# Patient Record
Sex: MALE | Race: WHITE | NOT HISPANIC OR LATINO | Employment: STUDENT | URBAN - METROPOLITAN AREA
[De-identification: names, ages, dates, MRNs, and addresses within clinical notes are randomized per-mention and may not be internally consistent; named-entity substitution may affect disease eponyms.]

---

## 2020-08-24 RX ORDER — MEMANTINE HYDROCHLORIDE 10 MG/1
TABLET ORAL
Qty: 180 TABLET | Refills: 1 | OUTPATIENT
Start: 2020-08-24

## 2020-12-06 RX ORDER — MEMANTINE HYDROCHLORIDE 10 MG/1
TABLET ORAL
Qty: 180 TABLET | Refills: 0 | Status: SHIPPED | OUTPATIENT
Start: 2020-12-06 | End: 2021-03-07

## 2021-02-12 ENCOUNTER — OFFICE VISIT (OUTPATIENT)
Dept: FAMILY MEDICINE CLINIC | Facility: CLINIC | Age: 21
End: 2021-02-12

## 2021-02-12 VITALS
BODY MASS INDEX: 18.83 KG/M2 | TEMPERATURE: 96.9 F | HEART RATE: 73 BPM | DIASTOLIC BLOOD PRESSURE: 60 MMHG | OXYGEN SATURATION: 100 % | WEIGHT: 120 LBS | HEIGHT: 67 IN | SYSTOLIC BLOOD PRESSURE: 100 MMHG

## 2021-02-12 DIAGNOSIS — R56.9 SEIZURE (HCC): ICD-10-CM

## 2021-02-12 DIAGNOSIS — Z79.899 HIGH RISK MEDICATION USE: ICD-10-CM

## 2021-02-12 DIAGNOSIS — F51.01 INSOMNIA, IDIOPATHIC: ICD-10-CM

## 2021-02-12 DIAGNOSIS — F84.0 AUTISM: Primary | ICD-10-CM

## 2021-02-12 DIAGNOSIS — E55.9 VITAMIN D DEFICIENCY: ICD-10-CM

## 2021-02-12 PROBLEM — G40.812 LENNOX-GASTAUT SYNDROME (HCC): Status: ACTIVE | Noted: 2019-04-26

## 2021-02-12 PROCEDURE — 99204 OFFICE O/P NEW MOD 45 MIN: CPT | Performed by: FAMILY MEDICINE

## 2021-02-12 RX ORDER — LANOLIN ALCOHOL/MO/W.PET/CERES
3 CREAM (GRAM) TOPICAL DAILY
COMMUNITY

## 2021-02-12 RX ORDER — CANNABIDIOL 100 MG/ML
SOLUTION ORAL
COMMUNITY
Start: 2021-02-09

## 2021-02-12 NOTE — PROGRESS NOTES
Subjective   Kush Buckley is a 20 y.o. male.     Vitals:    02/12/21 0908   BP: 100/60   Pulse: 73   Temp: 96.9 °F (36.1 °C)   SpO2: 100%        Chief Complaint   Patient presents with   • Autistic Spectrum     new to get established   • Seizures        History of Present Illness    Greater than 2-year follow-up on autism spectrum disorder, insomnia, vitamin D deficiency, and seizure disorder.  Here to get established today, due for labs?  LOV with me at [facility] February 2018, no changes made with [facility].  Does see neurologist (Dr. Valerio) every 6 months for seizure disorder.    Overall, doing very well all considering the uncertainty with changes in routine given the coronavirus pandemic.  Has been back in session on a regular basis at [facility], full-time and things are going well there.  However, will not wear mask.  No maladaptive behaviors or concerns from mom reported.  Tolerates and compliant with all medications.  All supportive services in place, no changes made.  Eating well, sleeping well.  Still takes melatonin nightly.    Yet, did have a breakthrough seizure last week.  This was the first seizure in over 8 to 9 months.  No changes in his seizure medication have been made in quite some time.  He does have a follow-up with his neurologist next week.  No labs have been done in over 3 years.    The following portions of the patient's history were reviewed and updated as appropriate: allergies, current medications, past family history, past medical history, past social history, past surgical history and problem list.    Review of Systems   Constitutional: Negative for unexpected weight gain and unexpected weight loss.   Respiratory: Negative for cough and shortness of breath.    Cardiovascular: Negative for chest pain.       Objective   Physical Exam  Vitals signs and nursing note reviewed.   Constitutional:       Appearance: Normal appearance. He is well-developed and normal weight.    HENT:      Head: Normocephalic and atraumatic.      Nose: Nose normal.   Eyes:      Conjunctiva/sclera: Conjunctivae normal.      Pupils: Pupils are equal, round, and reactive to light.   Neck:      Musculoskeletal: Normal range of motion and neck supple.      Thyroid: No thyromegaly.   Cardiovascular:      Rate and Rhythm: Normal rate and regular rhythm.      Heart sounds: Normal heart sounds. No murmur.   Pulmonary:      Effort: Pulmonary effort is normal.      Breath sounds: Normal breath sounds.   Abdominal:      General: Abdomen is flat. Bowel sounds are normal. There is no distension.      Palpations: Abdomen is soft. There is no hepatomegaly, splenomegaly or mass.      Tenderness: There is no abdominal tenderness. There is no guarding or rebound.      Hernia: No hernia is present.   Musculoskeletal: Normal range of motion.      Right lower leg: No edema.      Left lower leg: No edema.   Lymphadenopathy:      Cervical: No cervical adenopathy.   Skin:     General: Skin is warm.   Neurological:      General: No focal deficit present.      Mental Status: He is alert. Mental status is at baseline.   Psychiatric:         Mood and Affect: Mood normal. Affect is flat.         Speech: He is noncommunicative.         Behavior: Behavior normal. Behavior is cooperative.      Comments: Some stimming noted, very cooperative though          LABS/STUDIES --no labs since February 2018    Procedures     Assessment/Plan   Diagnoses and all orders for this visit:    1. Autism (Primary) --stable.  Continue Namenda 10 mg twice daily, will refill upon request  Continue with all supportive services through [facility] and [facility].  We will complete any necessary paperwork.    2. Insomnia, idiopathic --stable.  May continue over-the-counter melatonin    3. Vitamin D deficiency --controlled?  Recheck vitamin D level, if therapeutic then continue vitamin D 3 at 5000 units daily  -     Vitamin D 25  Hydroxy    4. Seizure (CMS/HCC) --recent breakthrough seizure  Thus will check lab work listed below  Continue Epidiolex as prescribed and follow-up with neurologist as planned next week  -     Zinc; Future  -     CBC & Differential  -     Comprehensive Metabolic Panel  -     Magnesium  -     TSH  -     Vitamin B12 & Folate    5. High risk medication use   -     CBC & Differential  -     Comprehensive Metabolic Panel                 Wore goggles and face mask during entire visit.    Return in about 1 year (around 2/12/2022) for Annual physical, Recheck.

## 2021-02-13 LAB
25(OH)D3+25(OH)D2 SERPL-MCNC: 71.7 NG/ML (ref 30–100)
ALBUMIN SERPL-MCNC: 5.3 G/DL (ref 3.5–5.2)
ALBUMIN/GLOB SERPL: 2.9 G/DL
ALP SERPL-CCNC: 183 U/L (ref 39–117)
ALT SERPL-CCNC: 61 U/L (ref 1–41)
AST SERPL-CCNC: 29 U/L (ref 1–40)
BASOPHILS # BLD AUTO: 0.02 10*3/MM3 (ref 0–0.2)
BASOPHILS NFR BLD AUTO: 0.3 % (ref 0–1.5)
BILIRUB SERPL-MCNC: 0.5 MG/DL (ref 0–1.2)
BUN SERPL-MCNC: 16 MG/DL (ref 6–20)
BUN/CREAT SERPL: 21.3 (ref 7–25)
CALCIUM SERPL-MCNC: 9.9 MG/DL (ref 8.6–10.5)
CHLORIDE SERPL-SCNC: 102 MMOL/L (ref 98–107)
CO2 SERPL-SCNC: 25.9 MMOL/L (ref 22–29)
CREAT SERPL-MCNC: 0.75 MG/DL (ref 0.76–1.27)
EOSINOPHIL # BLD AUTO: 0.03 10*3/MM3 (ref 0–0.4)
EOSINOPHIL NFR BLD AUTO: 0.5 % (ref 0.3–6.2)
ERYTHROCYTE [DISTWIDTH] IN BLOOD BY AUTOMATED COUNT: 11.6 % (ref 12.3–15.4)
FOLATE SERPL-MCNC: >20 NG/ML (ref 4.78–24.2)
GLOBULIN SER CALC-MCNC: 1.8 GM/DL
GLUCOSE SERPL-MCNC: 66 MG/DL (ref 65–99)
HCT VFR BLD AUTO: 48.6 % (ref 37.5–51)
HGB BLD-MCNC: 16.4 G/DL (ref 13–17.7)
IMM GRANULOCYTES # BLD AUTO: 0.02 10*3/MM3 (ref 0–0.05)
IMM GRANULOCYTES NFR BLD AUTO: 0.3 % (ref 0–0.5)
LYMPHOCYTES # BLD AUTO: 1.13 10*3/MM3 (ref 0.7–3.1)
LYMPHOCYTES NFR BLD AUTO: 17.6 % (ref 19.6–45.3)
MAGNESIUM SERPL-MCNC: 2 MG/DL (ref 1.7–2.2)
MCH RBC QN AUTO: 31.2 PG (ref 26.6–33)
MCHC RBC AUTO-ENTMCNC: 33.7 G/DL (ref 31.5–35.7)
MCV RBC AUTO: 92.4 FL (ref 79–97)
MONOCYTES # BLD AUTO: 0.37 10*3/MM3 (ref 0.1–0.9)
MONOCYTES NFR BLD AUTO: 5.8 % (ref 5–12)
NEUTROPHILS # BLD AUTO: 4.85 10*3/MM3 (ref 1.7–7)
NEUTROPHILS NFR BLD AUTO: 75.5 % (ref 42.7–76)
NRBC BLD AUTO-RTO: 0 /100 WBC (ref 0–0.2)
PLATELET # BLD AUTO: 226 10*3/MM3 (ref 140–450)
POTASSIUM SERPL-SCNC: 4.8 MMOL/L (ref 3.5–5.2)
PROT SERPL-MCNC: 7.1 G/DL (ref 6–8.5)
RBC # BLD AUTO: 5.26 10*6/MM3 (ref 4.14–5.8)
SODIUM SERPL-SCNC: 142 MMOL/L (ref 136–145)
TSH SERPL DL<=0.005 MIU/L-ACNC: 0.48 UIU/ML (ref 0.27–4.2)
VIT B12 SERPL-MCNC: 1286 PG/ML (ref 211–946)
WBC # BLD AUTO: 6.42 10*3/MM3 (ref 3.4–10.8)

## 2021-03-07 RX ORDER — MEMANTINE HYDROCHLORIDE 10 MG/1
TABLET ORAL
Qty: 180 TABLET | Refills: 3 | Status: SHIPPED | OUTPATIENT
Start: 2021-03-07 | End: 2023-02-17

## 2021-04-25 ENCOUNTER — APPOINTMENT (OUTPATIENT)
Dept: GENERAL RADIOLOGY | Facility: HOSPITAL | Age: 21
End: 2021-04-25

## 2021-04-25 PROCEDURE — 73630 X-RAY EXAM OF FOOT: CPT | Performed by: EMERGENCY MEDICINE

## 2022-02-11 ENCOUNTER — OFFICE VISIT (OUTPATIENT)
Dept: FAMILY MEDICINE CLINIC | Facility: CLINIC | Age: 22
End: 2022-02-11

## 2022-02-11 VITALS
TEMPERATURE: 98 F | HEART RATE: 106 BPM | BODY MASS INDEX: 20 KG/M2 | SYSTOLIC BLOOD PRESSURE: 110 MMHG | HEIGHT: 67 IN | WEIGHT: 127.4 LBS | OXYGEN SATURATION: 98 % | DIASTOLIC BLOOD PRESSURE: 70 MMHG

## 2022-02-11 DIAGNOSIS — Z00.00 WELLNESS EXAMINATION: Primary | ICD-10-CM

## 2022-02-11 DIAGNOSIS — R56.9 SEIZURE: ICD-10-CM

## 2022-02-11 DIAGNOSIS — E55.9 VITAMIN D DEFICIENCY: ICD-10-CM

## 2022-02-11 DIAGNOSIS — F84.0 AUTISM: ICD-10-CM

## 2022-02-11 PROCEDURE — 99395 PREV VISIT EST AGE 18-39: CPT | Performed by: FAMILY MEDICINE

## 2022-02-11 NOTE — PROGRESS NOTES
" Chief Complaint  Autistic Spectrum (yearly wellness)     Presents FOR YEARLY WELLNESS EXAM  And  1 year follow-up on autism spectrum disorder    LOV with me February 2021 for wellness exam/yearly checkup.  At that visit, no changes were made.  Yearly screening labs and labs for high risk meds were all WNL.  Accompanied with mother/guardian.    Overall, continues to do well.  He has only had 2 breakthrough seizures in the last year, last one being on Thanksgiving 2021.  This is much improved from previous years.  No changes with his chronic seizure med/Epidiolex (Mily's web, CBD oil.)  Maintains yearly follow-up with neurologist/Dr. Valerio.    No complaints or concerns from mother today.  Continues with [facility] services at [facility] x 5 days/week.  No concerns with sleep or behaviors.  Weight stable.  No longer taking Namenda --was no longer seeing benefit.    Routine health maintenance  Vaccinations are not up-to-date, declined by parents/guardians.  Maintains regular dental visits and optometry visits.  Maintains a healthy well-balanced diet and active lifestyle.  Family history negative for colon cancer, premature CAD, prostate cancer.      Review of Systems   Constitutional: Negative for fever and unexpected weight change.   Respiratory: Negative for cough and shortness of breath.    Cardiovascular: Negative for chest pain.        Geovany Buckley presents to [facility]    Objective   Vital Signs:   Vitals:    02/11/22 0949   BP: 110/70   Pulse: 106   Temp: 98 °F (36.7 °C)   SpO2: 98%   Weight: 57.8 kg (127 lb 6.4 oz)   Height: 168.9 cm (66.5\")      Physical Exam  Vitals and nursing note reviewed.   Constitutional:       Appearance: Normal appearance. He is well-developed and normal weight.   HENT:      Head: Normocephalic and atraumatic.      Nose: Nose normal.   Eyes:      Conjunctiva/sclera: Conjunctivae normal.      Pupils: Pupils " are equal, round, and reactive to light.   Neck:      Thyroid: No thyromegaly.   Cardiovascular:      Rate and Rhythm: Normal rate and regular rhythm.      Heart sounds: Normal heart sounds. No murmur heard.      Pulmonary:      Effort: Pulmonary effort is normal.      Breath sounds: Normal breath sounds.   Abdominal:      General: Abdomen is flat. Bowel sounds are normal. There is no distension.      Palpations: Abdomen is soft. There is no hepatomegaly, splenomegaly or mass.      Tenderness: There is no abdominal tenderness. There is no guarding or rebound.      Hernia: No hernia is present.   Musculoskeletal:         General: Normal range of motion.      Cervical back: Normal range of motion and neck supple.      Right lower leg: No edema.      Left lower leg: No edema.   Lymphadenopathy:      Cervical: No cervical adenopathy.   Skin:     General: Skin is warm.      Comments: No dysplastic or abnormal lesions    Several benign-appearing nevi on trunk   Neurological:      General: No focal deficit present.      Mental Status: He is alert.   Psychiatric:         Mood and Affect: Mood normal.         Behavior: Behavior normal.         Thought Content: Thought content normal.         Judgment: Judgment normal.        Result Review :         February 2021--- CBC, CMP, TSH, Mg ALL WNL                 Assessment and Plan    Diagnoses and all orders for this visit:    1. Wellness examination (Primary)  -within normal limits.  All screening up-to-date except needs labs listed below and several vaccines (yet parents/guardians decline despite recommendations.)  See orders below.  Otherwise, continue with a well-balanced diet, regular cardio exercise, and all autism/support services.  -     CBC & Differential  -     Comprehensive Metabolic Panel  -     SARS-CoV-2 Antibodies ([facility])    2. Autism spectrum disorder  -stable  Continue with all supportive services, will complete paperwork as needed.  Continue with [facility]    3. Seizure (HCC)  -stable.  Recheck thyroid studies, magnesium, CBC, and CMP --forward results to   Per neurologist  -     T4, Free  -     TSH  -     Magnesium    4. Vitamin D deficiency  -recheck vitamin D, further recommendations to follow  -     Vitamin D 1,25 Dihydroxy        Follow Up   Return in about 1 year (around 2/11/2023) for Annual physical.  Patient was given instructions and counseling regarding his condition or for health maintenance advice. Please see specific information pulled into the AVS if appropriate.       Answers for HPI/ROS submitted by the patient on 2/9/2022  What is the primary reason for your visit?: Physical

## 2022-02-14 LAB
1,25(OH)2D SERPL-MCNC: 92 PG/ML (ref 19.9–79.3)
ALBUMIN SERPL-MCNC: 5.1 G/DL (ref 4.1–5.2)
ALBUMIN/GLOB SERPL: 2.7 {RATIO} (ref 1.2–2.2)
ALP SERPL-CCNC: 152 IU/L (ref 44–121)
ALT SERPL-CCNC: 38 IU/L (ref 0–44)
AST SERPL-CCNC: 20 IU/L (ref 0–40)
BASOPHILS # BLD AUTO: 0 X10E3/UL (ref 0–0.2)
BASOPHILS NFR BLD AUTO: 0 %
BILIRUB SERPL-MCNC: 0.4 MG/DL (ref 0–1.2)
BUN SERPL-MCNC: 18 MG/DL (ref 6–20)
BUN/CREAT SERPL: 24 (ref 9–20)
CALCIUM SERPL-MCNC: 9.6 MG/DL (ref 8.7–10.2)
CHLORIDE SERPL-SCNC: 105 MMOL/L (ref 96–106)
CO2 SERPL-SCNC: 20 MMOL/L (ref 20–29)
CREAT SERPL-MCNC: 0.75 MG/DL (ref 0.76–1.27)
EOSINOPHIL # BLD AUTO: 0 X10E3/UL (ref 0–0.4)
EOSINOPHIL NFR BLD AUTO: 1 %
ERYTHROCYTE [DISTWIDTH] IN BLOOD BY AUTOMATED COUNT: 11.4 % (ref 11.6–15.4)
GLOBULIN SER CALC-MCNC: 1.9 G/DL (ref 1.5–4.5)
GLUCOSE SERPL-MCNC: 75 MG/DL (ref 65–99)
HCT VFR BLD AUTO: 48.7 % (ref 37.5–51)
HGB BLD-MCNC: 16.8 G/DL (ref 13–17.7)
IMM GRANULOCYTES # BLD AUTO: 0 X10E3/UL (ref 0–0.1)
IMM GRANULOCYTES NFR BLD AUTO: 0 %
LYMPHOCYTES # BLD AUTO: 1.1 X10E3/UL (ref 0.7–3.1)
LYMPHOCYTES NFR BLD AUTO: 17 %
MAGNESIUM SERPL-MCNC: 2.3 MG/DL (ref 1.6–2.3)
MCH RBC QN AUTO: 31.3 PG (ref 26.6–33)
MCHC RBC AUTO-ENTMCNC: 34.5 G/DL (ref 31.5–35.7)
MCV RBC AUTO: 91 FL (ref 79–97)
MONOCYTES # BLD AUTO: 0.3 X10E3/UL (ref 0.1–0.9)
MONOCYTES NFR BLD AUTO: 5 %
NEUTROPHILS # BLD AUTO: 4.9 X10E3/UL (ref 1.4–7)
NEUTROPHILS NFR BLD AUTO: 77 %
PLATELET # BLD AUTO: 233 X10E3/UL (ref 150–450)
POTASSIUM SERPL-SCNC: 4.6 MMOL/L (ref 3.5–5.2)
PROT SERPL-MCNC: 7 G/DL (ref 6–8.5)
RBC # BLD AUTO: 5.36 X10E6/UL (ref 4.14–5.8)
SARS-COV-2 AB SERPL QL IA: POSITIVE
SODIUM SERPL-SCNC: 141 MMOL/L (ref 134–144)
T4 FREE SERPL-MCNC: 1.24 NG/DL (ref 0.82–1.77)
TSH SERPL DL<=0.005 MIU/L-ACNC: 0.78 UIU/ML (ref 0.45–4.5)
WBC # BLD AUTO: 6.4 X10E3/UL (ref 3.4–10.8)

## 2023-02-17 ENCOUNTER — OFFICE VISIT (OUTPATIENT)
Dept: FAMILY MEDICINE CLINIC | Facility: CLINIC | Age: 23
End: 2023-02-17
Payer: COMMERCIAL

## 2023-02-17 VITALS
HEIGHT: 67 IN | SYSTOLIC BLOOD PRESSURE: 104 MMHG | BODY MASS INDEX: 21.06 KG/M2 | WEIGHT: 134.2 LBS | DIASTOLIC BLOOD PRESSURE: 64 MMHG | OXYGEN SATURATION: 98 % | TEMPERATURE: 97.7 F | HEART RATE: 100 BPM

## 2023-02-17 DIAGNOSIS — Z79.899 HIGH RISK MEDICATION USE: ICD-10-CM

## 2023-02-17 DIAGNOSIS — G40.812 NONINTRACTABLE LENNOX-GASTAUT SYNDROME WITHOUT STATUS EPILEPTICUS: ICD-10-CM

## 2023-02-17 DIAGNOSIS — E55.9 VITAMIN D DEFICIENCY: ICD-10-CM

## 2023-02-17 DIAGNOSIS — Z00.00 WELLNESS EXAMINATION: Primary | ICD-10-CM

## 2023-02-17 DIAGNOSIS — F84.0 AUTISM: ICD-10-CM

## 2023-02-17 DIAGNOSIS — R45.1 AGITATION: ICD-10-CM

## 2023-02-17 DIAGNOSIS — F51.01 INSOMNIA, IDIOPATHIC: ICD-10-CM

## 2023-02-17 DIAGNOSIS — R56.9 SEIZURE: ICD-10-CM

## 2023-02-17 DIAGNOSIS — D22.9 ATYPICAL NEVI: ICD-10-CM

## 2023-02-17 PROCEDURE — 99214 OFFICE O/P EST MOD 30 MIN: CPT | Performed by: FAMILY MEDICINE

## 2023-02-17 PROCEDURE — 99395 PREV VISIT EST AGE 18-39: CPT | Performed by: FAMILY MEDICINE

## 2023-02-17 RX ORDER — HYDROXYZINE HYDROCHLORIDE 10 MG/1
10 TABLET, FILM COATED ORAL 3 TIMES DAILY PRN
Qty: 60 TABLET | Refills: 3 | Status: SHIPPED | OUTPATIENT
Start: 2023-02-17

## 2023-02-17 NOTE — PROGRESS NOTES
Chief Complaint  Annual Exam (Yearly wellness), Autistic Spectrum, and Seizures (Breakthrough seizures, increased agitation)     PRESENTS FOR YEARLY WELLNESS EXAM  And  Yearly follow-up for autism spectrum disorder, seizure disorder, and mother reports increased agitation/poor sleep  He presents today with his mother/caregiver/guardian.  Limited verbal skills.    LOV with me 1 year ago/February 2022 for annual checkup with labs.  No changes made, all stable at that visit.    Other interval history since last visit with me --2 breakthrough seizures reported in 2022.  1 of which required an ER visit due to a laceration on head, May 2022.  Work-up, nothing significant.  Now seeing adult neurologist/Dr. Segal, most recent visit in December 2022, no med changes made.  However, considering the addition of the benzodiazepine?    Overall, continues to do fine.  No recent breakthrough seizures.  Appetite has improved, has gained about 5 to 10 pounds in the last year.  Still receives services 5 days/week at the [facility].    Mother does report some increase in agitation at times.  Sleep remains an issue, although improved.  When things get out of control, she will give him 2-3 Benadryl which seems to be effective but does make him quite drowsy.  She is currently researching low-dose Lamictal in the treatment of autism?    Routine health maintenance/screening test:  Vaccines --- declines all vaccines/per parent request  Smoking/ETOH Status --- non-smoker.  No alcohol use.  No high risk behaviors.  Dentist, Eye Exam, Derm --- maintains regular dental visits, eye exam, no dermatologist  Diet/Exercise --- restricted diet due to sensitivities, active lifestyle/regular exercise  Pertinent FH --- negative for premature CAD, colon cancer, mental health or neurologic disorders    Review of Systems   Constitutional: Negative for fever and unexpected weight change.   Respiratory: Negative for cough and shortness of  "breath.    Cardiovascular: Negative for chest pain.        Subjective          Kush Buckley presents to [facility]    Objective   Vital Signs:   Vitals:    02/17/23 0838   BP: 104/64   BP Location: Right arm   Patient Position: Sitting   Cuff Size: Adult   Pulse: 100   Temp: 97.7 °F (36.5 °C)   SpO2: 98%   Weight: 60.9 kg (134 lb 3.2 oz)   Height: 168.9 cm (66.5\")      Body mass index is 21.34 kg/m².   Physical Exam  Vitals and nursing note reviewed.   Constitutional:       Appearance: Normal appearance. He is well-developed.   HENT:      Head: Normocephalic and atraumatic.      Nose: Nose normal.   Eyes:      Conjunctiva/sclera: Conjunctivae normal.      Pupils: Pupils are equal, round, and reactive to light.   Neck:      Thyroid: No thyromegaly.   Cardiovascular:      Rate and Rhythm: Normal rate and regular rhythm.      Heart sounds: Normal heart sounds. No murmur heard.  Pulmonary:      Effort: Pulmonary effort is normal.      Breath sounds: Normal breath sounds.   Abdominal:      General: Abdomen is flat. Bowel sounds are normal. There is no distension.      Palpations: Abdomen is soft. There is no hepatomegaly, splenomegaly or mass.      Tenderness: There is no abdominal tenderness. There is no guarding or rebound.      Hernia: No hernia is present.   Musculoskeletal:         General: Normal range of motion.      Cervical back: Normal range of motion and neck supple.      Right lower leg: No edema.      Left lower leg: No edema.   Lymphadenopathy:      Cervical: No cervical adenopathy.   Skin:     General: Skin is warm.      Comments: Multiple nevi on trunk/back/chest/abdominal wall--all symmetric, except 1 on abdominal wall with slightly irregular borders and darker color   Neurological:      General: No focal deficit present.      Mental Status: He is alert.   Psychiatric:         Mood and Affect: Mood normal.         Behavior: Behavior normal.         Thought Content: Thought " content normal.         Judgment: Judgment normal.        Result Review :           Lab Results   Component Value Date    MXBV65FN 71.7 02/12/2021    FOLATE >20.00 02/12/2021 February 2022 --CBC, CMP, TSH all WNL, normal magnesium, vitamin D level 92    May 2022 --- CBC, CMP normal           Assessment and Plan    Diagnoses and all orders for this visit:    1. Wellness examination (Primary)  -within normal limits  Needed screening includes labs listed below, multiple vaccines yet family declines despite recommendations.  Nonfasting today, will hold on lipids.  Low risk for elevated cholesterol.  Otherwise, continue to improve upon healthy lifestyle --Needs low-carb/low calorie/low cholesterol diet and increase cardio exercise to greater than 150 minutes weekly  -     CBC & Differential  -     Comprehensive Metabolic Panel  -     SARS-CoV-2 Antibodies, Nucleocapsid (Natural Immunity)    2. Autism  -stable  Continue with special needs/support services through [facility]  Needed paperwork will continue to be reviewed and signed by me    3. Agitation  -new diagnosis, mild  Mother request an ammonia level.  Discussed adding hydroxyzine 10 mg 1 p.o. every 8 hours breakthrough panic/agitation, may take 1-2 p.o. nightly for insomnia.  Needs to hold Benadryl if taking hydroxyzine.  Discussed possibly adding low-dose Lamictal?  She will discuss further with neurologist    4. Insomnia, idiopathic  -fair control  See above plan for addition of hydroxyzine    5. 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 Seizure (HCC)  -     Magnesium  -     Ammonia    7. Vitamin D deficiency  -     Vitamin D,25-Hydroxy    8. High risk medication use  -     CBC & Differential  -     Comprehensive Metabolic Panel  -     TSH  -     Vitamin B12 & Folate    9. Atypical nevi  -new Dx  Concerning for dysplastic nevi on abdominal wall,  multiple nevi on trunk  Referral to dermatologist, may need biopsy  -     Ambulatory Referral to Dermatology    Other orders  -     hydrOXYzine (ATARAX) 10 MG tablet; Take 1 tablet by mouth 3 (Three) Times a Day As Needed for Anxiety.  Dispense: 60 tablet; Refill: 3    In addition to wellness exam today, seen and treated for separate and identifiable new atypical nevi and new diagnosis of agitation.  See above treatment plans        Follow Up   Return in about 1 year (around 2/17/2024) for Annual physical, may follow-up in 1 year if labs all WNL and doing well.  Patient was given instructions and counseling regarding his condition or for health maintenance advice. Please see specific information pulled into the AVS if appropriate.

## 2023-02-17 NOTE — PATIENT INSTRUCTIONS
Continue current treatment plan.     Further recommendations based on labs    May start hydroxyzine 10 mg 1 p.o. every 8 hours breakthrough anxiety/agitation or 1-2 p.o. nightly insomnia    Follow-up with neurologist as planned

## 2023-02-18 PROBLEM — D22.9 ATYPICAL NEVI: Status: ACTIVE | Noted: 2023-02-18

## 2023-02-18 PROBLEM — R45.1 AGITATION: Status: ACTIVE | Noted: 2023-02-18

## 2023-02-18 LAB
25(OH)D3+25(OH)D2 SERPL-MCNC: 93.9 NG/ML (ref 30–100)
ALBUMIN SERPL-MCNC: 5.3 G/DL (ref 4.1–5.2)
ALBUMIN/GLOB SERPL: 2.7 {RATIO} (ref 1.2–2.2)
ALP SERPL-CCNC: 175 IU/L (ref 44–121)
ALT SERPL-CCNC: 64 IU/L (ref 0–44)
AMMONIA PLAS-MCNC: 77 UG/DL (ref 36–136)
AST SERPL-CCNC: 29 IU/L (ref 0–40)
BASOPHILS # BLD AUTO: 0 X10E3/UL (ref 0–0.2)
BASOPHILS NFR BLD AUTO: 0 %
BILIRUB SERPL-MCNC: 0.4 MG/DL (ref 0–1.2)
BUN SERPL-MCNC: 19 MG/DL (ref 6–20)
BUN/CREAT SERPL: 24 (ref 9–20)
CALCIUM SERPL-MCNC: 9.6 MG/DL (ref 8.7–10.2)
CHLORIDE SERPL-SCNC: 103 MMOL/L (ref 96–106)
CO2 SERPL-SCNC: 20 MMOL/L (ref 20–29)
CREAT SERPL-MCNC: 0.8 MG/DL (ref 0.76–1.27)
EGFRCR SERPLBLD CKD-EPI 2021: 128 ML/MIN/1.73
EOSINOPHIL # BLD AUTO: 0.1 X10E3/UL (ref 0–0.4)
EOSINOPHIL NFR BLD AUTO: 1 %
ERYTHROCYTE [DISTWIDTH] IN BLOOD BY AUTOMATED COUNT: 11.4 % (ref 11.6–15.4)
FOLATE SERPL-MCNC: >20 NG/ML
GLOBULIN SER CALC-MCNC: 2 G/DL (ref 1.5–4.5)
GLUCOSE SERPL-MCNC: 84 MG/DL (ref 70–99)
HCT VFR BLD AUTO: 49.1 % (ref 37.5–51)
HGB BLD-MCNC: 16.2 G/DL (ref 13–17.7)
IMM GRANULOCYTES # BLD AUTO: 0 X10E3/UL (ref 0–0.1)
IMM GRANULOCYTES NFR BLD AUTO: 0 %
LYMPHOCYTES # BLD AUTO: 1 X10E3/UL (ref 0.7–3.1)
LYMPHOCYTES NFR BLD AUTO: 18 %
MAGNESIUM SERPL-MCNC: 2.1 MG/DL (ref 1.6–2.3)
MCH RBC QN AUTO: 30.3 PG (ref 26.6–33)
MCHC RBC AUTO-ENTMCNC: 33 G/DL (ref 31.5–35.7)
MCV RBC AUTO: 92 FL (ref 79–97)
MONOCYTES # BLD AUTO: 0.3 X10E3/UL (ref 0.1–0.9)
MONOCYTES NFR BLD AUTO: 6 %
NEUTROPHILS # BLD AUTO: 4.2 X10E3/UL (ref 1.4–7)
NEUTROPHILS NFR BLD AUTO: 75 %
PLATELET # BLD AUTO: 220 X10E3/UL (ref 150–450)
POTASSIUM SERPL-SCNC: 4.5 MMOL/L (ref 3.5–5.2)
PROT SERPL-MCNC: 7.3 G/DL (ref 6–8.5)
RBC # BLD AUTO: 5.35 X10E6/UL (ref 4.14–5.8)
SARS-COV-2 AB SERPL QL IA: POSITIVE
SODIUM SERPL-SCNC: 140 MMOL/L (ref 134–144)
TSH SERPL DL<=0.005 MIU/L-ACNC: 1.24 UIU/ML (ref 0.45–4.5)
VIT B12 SERPL-MCNC: 1256 PG/ML (ref 232–1245)
WBC # BLD AUTO: 5.6 X10E3/UL (ref 3.4–10.8)

## 2024-02-23 ENCOUNTER — OFFICE VISIT (OUTPATIENT)
Dept: FAMILY MEDICINE CLINIC | Facility: CLINIC | Age: 24
End: 2024-02-23
Payer: COMMERCIAL

## 2024-02-23 VITALS
TEMPERATURE: 97.5 F | OXYGEN SATURATION: 99 % | SYSTOLIC BLOOD PRESSURE: 120 MMHG | WEIGHT: 144.5 LBS | DIASTOLIC BLOOD PRESSURE: 68 MMHG | BODY MASS INDEX: 21.9 KG/M2 | HEART RATE: 100 BPM | HEIGHT: 68 IN

## 2024-02-23 DIAGNOSIS — F84.0 AUTISM: ICD-10-CM

## 2024-02-23 DIAGNOSIS — Z00.00 WELLNESS EXAMINATION: Primary | ICD-10-CM

## 2024-02-23 DIAGNOSIS — G40.812 NONINTRACTABLE LENNOX-GASTAUT SYNDROME WITHOUT STATUS EPILEPTICUS: ICD-10-CM

## 2024-02-23 DIAGNOSIS — R79.89 ELEVATED LIVER FUNCTION TESTS: ICD-10-CM

## 2024-02-23 DIAGNOSIS — R59.0 REACTIVE CERVICAL LYMPHADENOPATHY: ICD-10-CM

## 2024-02-23 DIAGNOSIS — F51.01 INSOMNIA, IDIOPATHIC: ICD-10-CM

## 2024-02-23 DIAGNOSIS — E55.9 VITAMIN D DEFICIENCY: ICD-10-CM

## 2024-02-23 NOTE — PATIENT INSTRUCTIONS
Lab work as directed    Follow-up with neurologist/Dr. Segal as directed    Further recommendations based on labs

## 2024-02-23 NOTE — PROGRESS NOTES
"Chief Complaint  Annual Exam (Yearly wellness), Autistic Spectrum, and Vitamin D Deficiency    NEEDS ANNUAL WELLNESS EXAM  And  1 year follow-up on autism spectrum disorder, seizure disorder, insomnia, vitamin D deficiency, mild elevation LFTs, and mother reports intermittent episodes of swelling in his neck  He presents today with his mother/guardian/Sherri Buckley.    Last visit with me 1 year ago/February 2023 for wellness exam, atypical nevi, uncontrolled sleep and some mild irritability.  --Basic routine lab work all WNL except for very mild elevation with LFTs and alk phos.  -- Referred to dermatologist due to concerns about some atypical nevi      Overall, doing well.  No recent breakthrough seizures, although he still has about 2 breakthrough seizures per year.  Overdue for follow-up with his neurologist/Dr. Segal.  No change with his seizure med.  They did discuss at last visit about possibly starting a low-dose benzodiazepine?    Appetite has improved, has gained about 5 to 10 pounds in the last year.  Sleep has really improved, takes melatonin and occasional Benadryl.  Still receives services 5 days/week at the [facility].     Mother's only concern today is some intermittent episodes of \"swelling in his neck.\"  She states she has noticed this over the last few months.  It seems as if one day his neck will be swollen and then the next day it will be fine.  Possible allergies?    Otherwise, doing well.  Requesting annual labs.  Compliant with and tolerates current medications without side effects.     Routine health maintenance/screening test:  Vaccines --- declines all vaccines/per parent request  Smoking/ETOH Status --- non-smoker.  No alcohol use.  No high risk behaviors.  Dentist, Eye Exam, Derm --- maintains regular dental visits, eye exam, sees dermatologist  Diet/Exercise --- restricted diet due to sensitivities, active lifestyle/regular exercise  Pertinent FH --- negative for " "premature CAD, colon cancer, mental health or neurologic disorders      Review of Systems   Constitutional:  Negative for fever and unexpected weight change.   Respiratory:  Negative for cough and shortness of breath.         Geovany Buckley presents to [facility]    Objective   Vital Signs:   Vitals:    02/23/24 1332 02/23/24 1340   BP: 126/80 120/68   BP Location: Right arm    Patient Position: Sitting    Cuff Size: Adult    Pulse: 100    Temp: 97.5 °F (36.4 °C)    SpO2: 99%    Weight: 65.5 kg (144 lb 8 oz)    Height: 171.5 cm (67.5\")       Body mass index is 22.3 kg/m².   Physical Exam  Vitals and nursing note reviewed.   Constitutional:       Appearance: Normal appearance. He is well-developed.   HENT:      Head: Normocephalic and atraumatic.      Nose: Nose normal.   Eyes:      Conjunctiva/sclera: Conjunctivae normal.      Pupils: Pupils are equal, round, and reactive to light.   Neck:      Thyroid: No thyromegaly.   Cardiovascular:      Rate and Rhythm: Normal rate and regular rhythm.      Heart sounds: Normal heart sounds. No murmur heard.  Pulmonary:      Effort: Pulmonary effort is normal. No respiratory distress.      Breath sounds: Normal breath sounds. No wheezing or rales.   Abdominal:      General: Abdomen is flat. Bowel sounds are normal. There is no distension.      Palpations: Abdomen is soft. There is no hepatomegaly, splenomegaly or mass.      Tenderness: There is no abdominal tenderness. There is no guarding or rebound.      Hernia: No hernia is present.   Musculoskeletal:         General: Normal range of motion.      Cervical back: Normal range of motion and neck supple.      Right lower leg: No edema.      Left lower leg: No edema.   Lymphadenopathy:      Cervical: No cervical adenopathy.   Skin:     General: Skin is warm.   Neurological:      General: No focal deficit present.      Mental Status: He is alert.   Psychiatric:         Mood and " Affect: Mood is anxious.         Speech: He is noncommunicative.         Behavior: Behavior is cooperative.        Result Review :           Lab Results   Component Value Date    FBAK01DA 93.9 02/17/2023    FOLATE >20.0 02/17/2023 February 2023 --ALT 64, alk phos 175, otherwise normal CMP, CBC, TSH, magnesium, B12             Assessment and Plan    Diagnoses and all orders for this visit:    1. Wellness examination (Primary) --within normal limits  All screening up-to-date except for vaccinations, but parent/guardian declines despite recommendations.  Due for screening labs listed below.  Otherwise, continue with healthy lifestyle --Needs low-carb/low calorie/low cholesterol diet and increase cardio exercise to greater than 150 minutes weekly   -     CBC & Differential  -     Comprehensive Metabolic Panel  -     Hepatitis C Antibody    2. Elevated liver function tests - -mildly abnormal.  Asymptomatic.  Suspect this was due to recent seizure last year?  Illness?  Repeat LFTs and additional inflammatory and autoimmune screening.  -     Comprehensive Metabolic Panel  -     TSH  -     KRUNAL  -     C-reactive Protein  -     Sedimentation Rate    3. Reactive cervical lymphadenopathy --new Dx  Intermittent episodes in the last few months.  Do suspect allergies?  No appreciable lymphadenopathy today.  If recurrent and persistent then needs to follow-up    4. Autism --stable  Continue with current services in place, plan of care paperwork reviewed and signed by me.    5. Nonintractable Lennox-Gastaut syndrome without status epilepticus --stable  Really needs to follow-up with neurologist, maintained on Epidiolex.  Check screening labs  -     Magnesium  -     Vitamin B12 & Folate    6. Insomnia, idiopathic --stable, doing well with melatonin    7. Vitamin D deficiency --overcontrolled??  /His vitamin D level pretty high.  Recheck today.  May need to back down vitamin D supplementation  -     Vitamin D,25-Hydroxy    In  addition to wellness exam today, seen and treated for separate and identifiable --- new Dx elevated LFTs, new Dx reactive cervical lymphadenopathy, and management of high risk meds/seizure disorder        Follow Up   Return in about 1 year (around 2/23/2025) for Annual physical, if all labs WNL and doing well.  Patient was given instructions and counseling regarding his condition or for health maintenance advice. Please see specific information pulled into the AVS if appropriate.

## 2024-02-26 LAB
25(OH)D3+25(OH)D2 SERPL-MCNC: 69.1 NG/ML (ref 30–100)
ALBUMIN SERPL-MCNC: 5.2 G/DL (ref 4.3–5.2)
ALBUMIN/GLOB SERPL: 2.9 {RATIO} (ref 1.2–2.2)
ALP SERPL-CCNC: 143 IU/L (ref 44–121)
ALT SERPL-CCNC: 48 IU/L (ref 0–44)
ANA SER QL: NEGATIVE
AST SERPL-CCNC: 21 IU/L (ref 0–40)
BASOPHILS # BLD AUTO: 0 X10E3/UL (ref 0–0.2)
BASOPHILS NFR BLD AUTO: 1 %
BILIRUB SERPL-MCNC: 0.2 MG/DL (ref 0–1.2)
BUN SERPL-MCNC: 24 MG/DL (ref 6–20)
BUN/CREAT SERPL: 29 (ref 9–20)
CALCIUM SERPL-MCNC: 9.7 MG/DL (ref 8.7–10.2)
CHLORIDE SERPL-SCNC: 104 MMOL/L (ref 96–106)
CO2 SERPL-SCNC: 20 MMOL/L (ref 20–29)
CREAT SERPL-MCNC: 0.83 MG/DL (ref 0.76–1.27)
CRP SERPL-MCNC: <1 MG/L (ref 0–10)
EGFRCR SERPLBLD CKD-EPI 2021: 126 ML/MIN/1.73
EOSINOPHIL # BLD AUTO: 0.1 X10E3/UL (ref 0–0.4)
EOSINOPHIL NFR BLD AUTO: 2 %
ERYTHROCYTE [DISTWIDTH] IN BLOOD BY AUTOMATED COUNT: 11.4 % (ref 11.6–15.4)
ERYTHROCYTE [SEDIMENTATION RATE] IN BLOOD BY WESTERGREN METHOD: 8 MM/HR (ref 0–15)
FOLATE SERPL-MCNC: >20 NG/ML
GLOBULIN SER CALC-MCNC: 1.8 G/DL (ref 1.5–4.5)
GLUCOSE SERPL-MCNC: 119 MG/DL (ref 70–99)
HCT VFR BLD AUTO: 47.6 % (ref 37.5–51)
HCV IGG SERPL QL IA: NON REACTIVE
HGB BLD-MCNC: 16.3 G/DL (ref 13–17.7)
IMM GRANULOCYTES # BLD AUTO: 0 X10E3/UL (ref 0–0.1)
IMM GRANULOCYTES NFR BLD AUTO: 1 %
LYMPHOCYTES # BLD AUTO: 1.2 X10E3/UL (ref 0.7–3.1)
LYMPHOCYTES NFR BLD AUTO: 18 %
MAGNESIUM SERPL-MCNC: 2.2 MG/DL (ref 1.6–2.3)
MCH RBC QN AUTO: 31 PG (ref 26.6–33)
MCHC RBC AUTO-ENTMCNC: 34.2 G/DL (ref 31.5–35.7)
MCV RBC AUTO: 91 FL (ref 79–97)
MONOCYTES # BLD AUTO: 0.3 X10E3/UL (ref 0.1–0.9)
MONOCYTES NFR BLD AUTO: 5 %
NEUTROPHILS # BLD AUTO: 4.8 X10E3/UL (ref 1.4–7)
NEUTROPHILS NFR BLD AUTO: 73 %
PLATELET # BLD AUTO: 214 X10E3/UL (ref 150–450)
POTASSIUM SERPL-SCNC: 4.5 MMOL/L (ref 3.5–5.2)
PROT SERPL-MCNC: 7 G/DL (ref 6–8.5)
RBC # BLD AUTO: 5.26 X10E6/UL (ref 4.14–5.8)
SODIUM SERPL-SCNC: 143 MMOL/L (ref 134–144)
TSH SERPL DL<=0.005 MIU/L-ACNC: 0.99 UIU/ML (ref 0.45–4.5)
VIT B12 SERPL-MCNC: 1340 PG/ML (ref 232–1245)
WBC # BLD AUTO: 6.5 X10E3/UL (ref 3.4–10.8)

## 2025-07-15 ENCOUNTER — TELEPHONE (OUTPATIENT)
Dept: FAMILY MEDICINE CLINIC | Facility: CLINIC | Age: 25
End: 2025-07-15
Payer: COMMERCIAL

## 2025-07-15 NOTE — TELEPHONE ENCOUNTER
Sammi asked me to message her to put in lab request for Kush to run labs prior to our mid August appt. with her so we can review results at the visit. Can you ask her to do the stuff she's done in the past, including:     CBC  CMP  Ammonia  Vit D  Magnesium  C-Reactive Protein  KRUNAL  Sed Rate  Vit B12 and folate  TSH  TH4 (free)  TH3  (Free).  I'd also like a urinalysis.     Is it possible that I could take this to a [facility] out near [place], since I think your lab closes before Kush is home everyday.  If not, I can try to get him to your place before it closes by getting him out from his autism center one day.  Thanks so much , as always!  Sherri

## 2025-07-16 DIAGNOSIS — G40.812 NONINTRACTABLE LENNOX-GASTAUT SYNDROME WITHOUT STATUS EPILEPTICUS: ICD-10-CM

## 2025-07-16 DIAGNOSIS — F51.01 INSOMNIA, IDIOPATHIC: ICD-10-CM

## 2025-07-16 DIAGNOSIS — F84.0 AUTISM: ICD-10-CM

## 2025-07-16 DIAGNOSIS — E55.9 VITAMIN D DEFICIENCY: ICD-10-CM

## 2025-07-16 DIAGNOSIS — R79.89 ELEVATED LIVER FUNCTION TESTS: ICD-10-CM

## 2025-07-16 DIAGNOSIS — Z00.00 WELLNESS EXAMINATION: Primary | ICD-10-CM

## 2025-08-11 ENCOUNTER — OFFICE VISIT (OUTPATIENT)
Dept: FAMILY MEDICINE CLINIC | Facility: CLINIC | Age: 25
End: 2025-08-11
Payer: COMMERCIAL

## 2025-08-11 VITALS
WEIGHT: 140.4 LBS | TEMPERATURE: 98.6 F | HEART RATE: 84 BPM | OXYGEN SATURATION: 97 % | BODY MASS INDEX: 21.28 KG/M2 | DIASTOLIC BLOOD PRESSURE: 70 MMHG | SYSTOLIC BLOOD PRESSURE: 120 MMHG | HEIGHT: 68 IN

## 2025-08-11 DIAGNOSIS — R79.89 ELEVATED LIVER FUNCTION TESTS: ICD-10-CM

## 2025-08-11 DIAGNOSIS — F84.0 AUTISM: ICD-10-CM

## 2025-08-11 DIAGNOSIS — R45.1 AGITATION: ICD-10-CM

## 2025-08-11 DIAGNOSIS — D22.9 ATYPICAL NEVI: ICD-10-CM

## 2025-08-11 DIAGNOSIS — R56.9 SEIZURE: ICD-10-CM

## 2025-08-11 DIAGNOSIS — R74.8 ELEVATED ALKALINE PHOSPHATASE LEVEL: ICD-10-CM

## 2025-08-11 DIAGNOSIS — Z79.899 HIGH RISK MEDICATION USE: ICD-10-CM

## 2025-08-11 DIAGNOSIS — Z00.00 WELLNESS EXAMINATION: Primary | ICD-10-CM

## 2025-08-26 DIAGNOSIS — R79.89 ELEVATED LIVER FUNCTION TESTS: ICD-10-CM

## 2025-08-26 DIAGNOSIS — R74.8 ELEVATED ALKALINE PHOSPHATASE LEVEL: Primary | ICD-10-CM

## 2025-08-26 DIAGNOSIS — Z79.899 HIGH RISK MEDICATION USE: ICD-10-CM

## 2025-08-29 DIAGNOSIS — R79.89 ELEVATED LIVER FUNCTION TESTS: ICD-10-CM

## 2025-08-29 DIAGNOSIS — R45.1 AGITATION: ICD-10-CM

## 2025-08-29 DIAGNOSIS — Z79.899 HIGH RISK MEDICATION USE: ICD-10-CM

## 2025-08-29 DIAGNOSIS — G40.812 NONINTRACTABLE LENNOX-GASTAUT SYNDROME WITHOUT STATUS EPILEPTICUS: ICD-10-CM

## 2025-08-29 DIAGNOSIS — F84.0 AUTISM: ICD-10-CM

## 2025-08-29 DIAGNOSIS — R74.8 ELEVATED ALKALINE PHOSPHATASE LEVEL: Primary | ICD-10-CM
